# Patient Record
Sex: FEMALE | Race: WHITE
[De-identification: names, ages, dates, MRNs, and addresses within clinical notes are randomized per-mention and may not be internally consistent; named-entity substitution may affect disease eponyms.]

---

## 2020-04-27 ENCOUNTER — HOSPITAL ENCOUNTER (EMERGENCY)
Dept: HOSPITAL 74 - JER | Age: 40
Discharge: HOME | End: 2020-04-27
Payer: COMMERCIAL

## 2020-04-27 VITALS — SYSTOLIC BLOOD PRESSURE: 129 MMHG | TEMPERATURE: 98.8 F | HEART RATE: 98 BPM | DIASTOLIC BLOOD PRESSURE: 59 MMHG

## 2020-04-27 DIAGNOSIS — J45.901: Primary | ICD-10-CM

## 2020-04-27 PROCEDURE — 3E0F7GC INTRODUCTION OF OTHER THERAPEUTIC SUBSTANCE INTO RESPIRATORY TRACT, VIA NATURAL OR ARTIFICIAL OPENING: ICD-10-PCS

## 2020-04-27 NOTE — PDOC
History of Present Illness





- General


Chief Complaint: Asthma


Stated Complaint: SOB


Time Seen by Provider: 04/27/20 19:36


History Source: Patient





- History of Present Illness


Initial Comments: 





04/27/20 20:18


39 year old female with cough, congestion and wheezing on and off x 2 months. 

patient reports no significant improvement in wheezing. patient reports that she

ran out medication .





PMHX: asthma





Past History





- Past Medical History


Allergies/Adverse Reactions: 


                                    Allergies











Allergy/AdvReac Type Severity Reaction Status Date / Time


 


No Known Allergies Allergy   Verified 03/15/20 15:41











Home Medications: 


Ambulatory Orders





predniSONE [Deltasone -] 40 mg PO BID #8 tablet 03/15/20 


Albuterol Sulfate Inhaler - [Ventolin HFA Inhaler -] 1 - 2 inh PO Q4H #1 inhaler

04/14/20 


Methylprednisolone [Medrol Dose Vinicius] 4 mg PO ASDIR #21 tablet 04/14/20 


Albuterol 0.083% Nebulizer Sol [Ventolin 0.083% Nebulizer Soln -] 1 neb NEB Q6H 

#30 vial 04/27/20 


Azithromycin [Zithromax 250mg Tablets -] 250 mg PO UTDICT #6 tab 04/27/20 


predniSONE [Deltasone -] 40 mg PO DAILY #8 tablet 04/27/20 








Asthma: Yes


COPD: No


Other medical history: bronchitis





- Psycho Social/Smoking Cessation Hx


Smoking History: Never smoked


Hx Alcohol Use: No


Drug/Substance Use Hx: No





**Review of Systems





- Review of Systems


Able to Perform ROS?: Yes


Is the patient limited English proficient: No


Constitutional: No: Symptoms Reported, See HPI, Chills, Diaphoresis, Fever, Loss

of Appetite, Malaise, Night Sweats, Weakness, Weight Stable, Unintentional Wgt. 

Loss, Unexplained wgt Loss, Other


Respiratory: Yes: Cough, Wheezing.  No: Symptoms reported, See HPI, Orthopnea, 

Shortness of Breath, SOB with Exertion, SOB at Rest, Stridor, Productive cough, 

Hemoptysis, Other


Cardiac (ROS): No: Symptoms Reported, See HPI, Chest Pain, Edema, Irregular 

Heart Rate, Lightheadedness, Palpitations, Syncope, Chest Tightness, Other





*Physical Exam





- Vital Signs


                                Last Vital Signs











Temp Pulse Resp BP Pulse Ox


 


 98.8 F   98 H  19   129/59 L  100 


 


 04/27/20 19:37  04/27/20 19:37  04/27/20 19:37  04/27/20 19:37  04/27/20 19:37














- Physical Exam


General Appearance: Yes: Appropriately Dressed


Respiratory/Chest: positive: Accessory Muscle Use, Wheezing


Cardiovascular: positive: Tachycardia


Extremity: positive: Normal Capillary Refill


Integumentary: positive: Normal Color, Dry, Warm


Neurologic: positive: Fully Oriented, Alert, Normal Mood/Affect





ED Treatment Course





- RADIOLOGY


Radiology Studies Ordered: 














 Category Date Time Status


 


 CHEST X-RAY PORTABLE* [RAD] Stat Radiology  04/27/20 19:37 Taken














- Medications


Given in the ED: 


ED Medications














Discontinued Medications














Generic Name Dose Route Start Last Admin





  Trade Name Freq  PRN Reason Stop Dose Admin


 


Albuterol Sulfate  2 puff  04/27/20 19:37  04/27/20 19:47





  Ventolin Hfa Inhaler -  IH  04/27/20 19:38  2 puff





  ONCE ONE   Administration














ED Progress Note





- Progress Note


Progress Note: 





04/27/20 20:24


A: asthma exacerbation/ bronchitis/ suspected covid?











P: covid negative last visit








chest xray








albuterol





prednisone








azithromycin














Discharge





- Discharge Information


Problems reviewed: Yes


Clinical Impression/Diagnosis: 


 Suspected 2019 novel coronavirus infection





Asthma


Qualifiers:


 Asthma severity: mild Asthma persistence: unspecified Asthma complication type:

 with acute exacerbation Qualified Code(s): J45.901 - Unspecified asthma with 

(acute) exacerbation





Disposition: HOME





- Additional Discharge Information


Prescriptions: 


predniSONE [Deltasone -] 40 mg PO DAILY #8 tablet


Albuterol 0.083% Nebulizer Sol [Ventolin 0.083% Nebulizer Soln -] 1 neb NEB Q6H 

#30 vial


Azithromycin [Zithromax 250mg Tablets -] 250 mg PO UTDICT #6 tab





- Follow up/Referral





- Patient Discharge Instructions


Patient Printed Discharge Instructions:  Asthma -- Adult


Additional Instructions: 


You were seen for your cough and possible Coronavirus (COVID-19)


Please call the [facility] testing center to make an appointment at 

[phone] or you can call [facility] at [phone] from

8:30 AM to 6 PM; or you can visit the [facility] website:


[email or web address] for more 

information about testing at the [facility].


Take Tylenol 650 mg every 6 hours as needed for fever or pain.  You may take 

Robitussin or other over-the-counter cough syrup.  Follow the dosing 

instructions on the bottle.


Warm tea, honey, and salt water gargles may help your symptoms.  Please take 

precautions and self quarantine for 2 weeks and follow-up with your primary care

doctor and the Department of Health.





Return to the nearest emergency department for shortness of breath, difficulty b

reathing, chest pain, or if you have any changes in your symptoms.





- Post Discharge Activity


Work/Back to School Note:  Back to Work

## 2020-04-27 NOTE — PDOC
Rapid Medical Evaluation


Chief Complaint: Asthma


Time Seen by Provider: 04/27/20 19:36


Medical Evaluation: 


                                    Allergies











Allergy/AdvReac Type Severity Reaction Status Date / Time


 


No Known Allergies Allergy   Verified 03/15/20 15:41











04/27/20 19:36


39 year old female c/o wheezing  and cough ~ 2 months. 3 days ago she ran out of

meds as per patient. patient with history of asthma.








PE: patient alert ox3. dry cough








A: asthma exacerbation








P: albuterol





prednisone











04/27/20 20:08








**Discharge Disposition





- Diagnosis


Asthma


Qualifiers:


 Asthma severity: mild Asthma persistence: unspecified Asthma complication type:

with acute exacerbation Qualified Code(s): J45.901 - Unspecified asthma with 

(acute) exacerbation








- Referrals





- Patient Instructions





- Post Discharge Activity

## 2020-10-05 ENCOUNTER — HOSPITAL ENCOUNTER (EMERGENCY)
Dept: HOSPITAL 74 - JER | Age: 40
Discharge: HOME | End: 2020-10-05
Payer: COMMERCIAL

## 2020-10-05 VITALS — HEART RATE: 83 BPM | TEMPERATURE: 98.1 F | SYSTOLIC BLOOD PRESSURE: 111 MMHG | DIASTOLIC BLOOD PRESSURE: 79 MMHG

## 2020-10-05 VITALS — BODY MASS INDEX: 37 KG/M2

## 2020-10-05 DIAGNOSIS — M79.605: ICD-10-CM

## 2020-10-05 DIAGNOSIS — M54.2: Primary | ICD-10-CM
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
## 2020-10-05 NOTE — XMS
Summarization Of Episode

                           Created on:2020



Patient:LEXIE MOORE

Sex:Female

:1980

External Reference #:[id number]





Demographics







                          Address                   [address]



                                                    [place] [address]

 

                          Home Phone                [phone]

 

                          Work Phone                [phone]

 

                          Preferred Language        English

 

                          Marital Status             or 

 

                          Worship Affiliation     [place]

 

                          Race                      [facility]

 

                          Ethnic Group               or 









Author







                          Organization              [facility]









Support







                Name            Relationship    Address         Phone

 

                MP MOORE          [address]  [phone]



                                                [address]            



                                                [place] [address] 

 

                ANJ             Unavailable     UNK             [phone]



                                                [place] [address] 

 

                NAUN MOORE          [address]  [phone]



                                                [address]            



                                                [place] [address] 

 

                LEXIE BROWN SELF / SAME AS PATIENT [address]  [phone]



                                                [address]            



                                                [place] [address] 

 

                UE              Unavailable     Unavailable     Unavailable

 

                MP MOORE Mother          [address]  Unavailable



                                                [place] [address] 









Re-disclosure Warning

The records that you are about to access may contain information from federally-
assisted alcohol or drug abuse programs. If such information is present, then 
the following federally mandated warning applies: This information has been 
disclosed to you from records protected by federal confidentiality rules (42 CFR
part 2). The federal rules prohibit you from making any further disclosure of 
this information unless further disclosure is expressly permitted by the written
consent of the person to whom it pertains or as otherwise permitted by 42 CFR 
part 2. A general authorization for the release of medical or other information 
is NOT sufficient for this purpose. The Federal rules restrict any use of the 
information to criminally investigate or prosecute any alcohol or drug abuse 
patient.The records that you are about to access may contain highly sensitive 
health information, the redisclosure of which is protected by Article 27-F of 
the [facility] Public Health law. If you continue you may haveaccess to 
information: Regarding HIV / AIDS; Provided by facilities licensed or operated 
by the [facility] Office of Mental Health; or Provided by the [facility]
Office for People With Developmental Disabilities. If such information is 
present, then the following [place] mandated warning applies: This 
information has been disclosed to you from confidential records which are 
protected by state law. State law prohibits you from making any further 
disclosure of this information without the specific written consent of the 
person to whom it pertains, or as otherwise permitted by law. Any unauthorized 
further disclosure in violation of state law may result in a fine or long-term 
sentence or both. A general authorization for the release of medical or other 
information is NOT sufficient authorization for further disclosure.



Insurance Providers







          Payer name Policy type Policy ID Covered   Covered party's Policy    P

dalia



                    / Coverage           party ID  relationship to Miller    Inf

ormation



                    type                          miller              

 

          [employer]             [id number]                             [id number]

[id number]



          HEALTH NON                                                   



          CAP                                                         

 

          [employer]             [id number]           SP                  [id number]

[id number]



          EXCHANGE                                                    

 

          SELF PAY                                SP                  



          INSURANCE                                                   







Results







                    ID                  Date                Data Source

 

                    [id number]           2020 12:00:00 AM EDT [facility]











          Name      Value     Range     Interpretation Code Description Data Elza

rce(s) Supporting



                                                                      Document(s

)

 

          2019[facility]CoV                                         [facility]    



          RNA XXX                                                     



          AISHA+probe-                                                   



          Imp                                                         









                                        This lab was ordered by [facility] and reported by [employer].











                    ID                  Date                Data Source

 

                    [id number]         03/15/2020 03:53:00 PM EDT [facility]











          Name      Value     Range     Interpretation Code Description Data Elza

rce(s) Supporting



                                                                      Document(s

)

 

          2019-NCOV                                         [facility]   



          RNA PNL                                                     



          XXX                                                         



          AISHA+PROBE                                                   









                                        This lab was ordered by [facility] and reported by [facility].









                                        Procedure